# Patient Record
Sex: FEMALE | Race: WHITE | NOT HISPANIC OR LATINO | Employment: UNEMPLOYED | URBAN - METROPOLITAN AREA
[De-identification: names, ages, dates, MRNs, and addresses within clinical notes are randomized per-mention and may not be internally consistent; named-entity substitution may affect disease eponyms.]

---

## 2019-09-30 ENCOUNTER — HOSPITAL ENCOUNTER (EMERGENCY)
Facility: MEDICAL CENTER | Age: 39
End: 2019-10-01
Attending: EMERGENCY MEDICINE
Payer: COMMERCIAL

## 2019-09-30 DIAGNOSIS — G43.809 OTHER MIGRAINE WITHOUT STATUS MIGRAINOSUS, NOT INTRACTABLE: ICD-10-CM

## 2019-09-30 PROCEDURE — 99283 EMERGENCY DEPT VISIT LOW MDM: CPT

## 2019-09-30 SDOH — HEALTH STABILITY: MENTAL HEALTH: HOW OFTEN DO YOU HAVE A DRINK CONTAINING ALCOHOL?: MONTHLY OR LESS

## 2019-09-30 ASSESSMENT — PAIN SCALES - WONG BAKER: WONGBAKER_NUMERICALRESPONSE: HURTS AS MUCH AS POSSIBLE

## 2019-10-01 VITALS
OXYGEN SATURATION: 97 % | TEMPERATURE: 97.9 F | SYSTOLIC BLOOD PRESSURE: 132 MMHG | BODY MASS INDEX: 37.33 KG/M2 | WEIGHT: 275.57 LBS | HEART RATE: 64 BPM | RESPIRATION RATE: 20 BRPM | HEIGHT: 72 IN | DIASTOLIC BLOOD PRESSURE: 84 MMHG

## 2019-10-01 PROCEDURE — 700102 HCHG RX REV CODE 250 W/ 637 OVERRIDE(OP): Performed by: EMERGENCY MEDICINE

## 2019-10-01 PROCEDURE — A9270 NON-COVERED ITEM OR SERVICE: HCPCS | Performed by: EMERGENCY MEDICINE

## 2019-10-01 RX ORDER — SERTRALINE HYDROCHLORIDE 100 MG/1
100 TABLET, FILM COATED ORAL DAILY
COMMUNITY

## 2019-10-01 RX ORDER — VERAPAMIL HYDROCHLORIDE 80 MG/1
80 TABLET ORAL 3 TIMES DAILY
COMMUNITY

## 2019-10-01 RX ORDER — METOCLOPRAMIDE 10 MG/1
10 TABLET ORAL ONCE
Status: COMPLETED | OUTPATIENT
Start: 2019-10-01 | End: 2019-10-01

## 2019-10-01 RX ORDER — ATORVASTATIN CALCIUM 20 MG/1
20 TABLET, FILM COATED ORAL NIGHTLY
COMMUNITY

## 2019-10-01 RX ORDER — METOCLOPRAMIDE 10 MG/1
10 TABLET ORAL 4 TIMES DAILY PRN
Qty: 20 TAB | Refills: 0 | Status: SHIPPED | OUTPATIENT
Start: 2019-10-01 | End: 2019-10-06

## 2019-10-01 RX ORDER — SUMATRIPTAN 50 MG/1
100 TABLET, FILM COATED ORAL
COMMUNITY

## 2019-10-01 RX ORDER — TOPIRAMATE 100 MG/1
150 TABLET, FILM COATED ORAL 2 TIMES DAILY
COMMUNITY

## 2019-10-01 RX ORDER — DIPHENHYDRAMINE HCL 25 MG
25 TABLET ORAL ONCE
Status: COMPLETED | OUTPATIENT
Start: 2019-10-01 | End: 2019-10-01

## 2019-10-01 RX ADMIN — DIPHENHYDRAMINE HCL 25 MG: 25 TABLET ORAL at 01:11

## 2019-10-01 RX ADMIN — METOCLOPRAMIDE HYDROCHLORIDE 10 MG: 10 TABLET ORAL at 01:11

## 2019-10-01 NOTE — ED TRIAGE NOTES
Jamar Benson  39 y.o. female  Chief Complaint   Patient presents with   • Head Ache     complex migraines (hx chronic hemiplegic migraines) since this afternoon.  +blurred vision, tingling extremities       Pt amb to triage with steady gait for above complaint.  Pt is alert and oriented, speaking in full sentences, follows commands and responds appropriately to questions. NAD. Resp are even and unlabored.  Pt placed in lobby. Pt educated on triage process. Pt encouraged to alert staff for any changes.  /90   Pulse 86   Temp 36.6 °C (97.9 °F) (Temporal)   Resp 20   Ht 1.829 m (6')   Wt 125 kg (275 lb 9.2 oz)   SpO2 97%   BMI 37.37 kg/m²

## 2019-10-01 NOTE — ED PROVIDER NOTES
ER Provider Note     Scribed for Jonathon Higginbotham M.D. by Liam Jimenez. 10/1/2019, 12:54 AM.    Primary Care Provider: Pcp patient states none  Means of Arrival: Walk In   History obtained from: Patient  History limited by: None     CHIEF COMPLAINT  Chief Complaint   Patient presents with   • Head Ache     complex migraines (hx chronic hemiplegic migraines) since this afternoon.  +blurred vision, tingling extremities       HPI  Jamar Benson is a 39 y.o. Female with history of chronic hemiplegic migraines who presents to the Emergency Department complaining of severe headache onset earlier this afternoon. Patient reports that her headache today is similar to the other migraines that she usually experiences. She presents today, however, because her normal migraine medications have not been effective in treating her migraine today. The patient notes that her last severe migraine was in April of this year and she was admitted at that time for further care. She has not had a migraine since then after changing her migraine medications. Patient notes that she has been undergoing tremendous stress recently due to a divorce and custody menard. She believes that this stress is contributing to her symptoms today. Patient endorses some blurred vision and some tingling of her extremities at this time, but denies any abdominal pain, fever, or loss of sensations.    REVIEW OF SYSTEMS  See HPI for further details.    PAST MEDICAL HISTORY   has a past medical history of Diabetes (HCC).    SURGICAL HISTORY  patient denies any surgical history    SOCIAL HISTORY  Social History     Tobacco Use   • Smoking status: Current Every Day Smoker     Types: Cigars   • Smokeless tobacco: Never Used   Substance Use Topics   • Alcohol use: Yes     Frequency: Monthly or less   • Drug use: Never      Social History     Substance and Sexual Activity   Drug Use Never       FAMILY HISTORY  History reviewed. No pertinent family  history.    CURRENT MEDICATIONS  MEDICATIONS GIVEN IN THE E.D.  Medications   metoclopramide (REGLAN) tablet 10 mg (10 mg Oral Given 10/1/19 0111)   diphenhydrAMINE (BENADRYL) tablet/capsule 25 mg (25 mg Oral Given 10/1/19 0111)       ALLERGIES  Allergies   Allergen Reactions   • Latex      Hives, rash   • Risperdal [Benzoic Acid-Risperidone]      Muscle spasms       PHYSICAL EXAM  VITAL SIGNS: /90   Pulse 86   Temp 36.6 °C (97.9 °F) (Temporal)   Resp 20   Ht 1.829 m (6')   Wt 125 kg (275 lb 9.2 oz)   SpO2 97%   BMI 37.37 kg/m²      Constitutional: Alert in mild distress.  HENT: No signs of trauma, Bilateral external ears normal, Nose normal.   Eyes: Pupils are equal and reactive, Conjunctiva normal, Non-icteric.   Neck: Normal range of motion, No tenderness, Supple, No stridor.   Lymphatic: No lymphadenopathy noted.   Cardiovascular: Regular rate and rhythm, no murmurs.   Thorax & Lungs: Normal breath sounds, No respiratory distress, No wheezing, No chest tenderness.   Abdomen: Bowel sounds normal, Soft, No tenderness, No masses, No pulsatile masses. No peritoneal signs.  Skin: Warm, Dry, No erythema, No rash.   Back: No bony tenderness, No CVA tenderness.   Extremities: Intact distal pulses, No edema, No tenderness, No cyanosis.  Musculoskeletal: Good range of motion in all major joints. No tenderness to palpation or major deformities noted.   Neurologic: Alert , Normal motor function, Normal sensory function, No focal deficits noted. The patient has a normal finger-nose-finger. Patient has 5 out of 5 strength in  as well as pushes and pulls in her upper extremity. Sensation is intact to light touch in all extremities. The patient has no dysmetria. The patient has normal cranial nerves III through XII  Psychiatric: Affect normal, Judgment normal, Mood normal.       COURSE & MEDICAL DECISION MAKING  Pertinent Labs & Imaging studies reviewed. (See chart for details)    This is a 39 y.o. female that  presents with a migraine that is the same as her prior migraines.  She has a normal neurologic exam at this time.  She appears to be in only mild distress.  I will treat her with medications and then reassess.  No evidence of increased intracranial pressure.  No evidence to suggest meningitis.    12:54 AM - Patient seen and examined at bedside. Patient will be medicated with Reglan 10 mg and Benadryl 25 mg for her symptoms. Discussed with the patient that I would like to attempt to her alleviate her symptoms today with Reglan and Benadryl as she has not tried these medications before. I have answered the patient's questions regarding these medications. If the patient's condition improves, she will be safe for discharge. Patient agrees to plan of care.    1:51 AM - Patient was reevaluated at bedside. She is resting in bed feeling improved. Patient is safe for discharge at this time. She will be prescribed Reglan as outpatient medication. She understands proper use and dosage of this medication. The patient has been instructed to follow up with her PCP for further migraine and pain management. She is agreeable and understanding to discharge.    The patient will return for new or worsening symptoms and is stable at the time of discharge.      DISPOSITION:  Patient will be discharged home in stable condition.    FOLLOW UP:  [address] [address]  [phone]  Go in 2 days        OUTPATIENT MEDICATIONS:  New Prescriptions    METOCLOPRAMIDE (REGLAN) 10 MG TAB    Take 1 Tab by mouth 4 times a day as needed for up to 5 days.       FINAL IMPRESSION  1. Other migraine without status migrainosus, not intractable          Liam SANCHEZ (Don), am scribing for, and in the presence of, Jonathon Higginbotham M.D..    Electronically signed by: Liam Jimenez (Don), 10/1/2019    Jonathon SANCHEZ M.D. personally performed the services described in this documentation, as scribed by Liam Jimenez in my  presence, and it is both accurate and complete.     E    The note accurately reflects work and decisions made by me.  Jonathon Higginbotham  10/1/2019  5:05 AM

## 2019-10-01 NOTE — ED NOTES
Pt ambulatory to treatment room. She states she has had a headache (descbribed as migraine) with onset this afternoon. Pt endorses hx of migraines and states this presentation is the same as her previous headaches. Tonight however, the pain did not go away with her normal medications for home treatment. She notes that her last migraine was in April, and she has not had one since changing her medications. Pt also states that she is under tremendous stress, going through a divorce and custody menard. She is concerned that the stress is contributing to her symptoms.